# Patient Record
Sex: FEMALE | Race: BLACK OR AFRICAN AMERICAN | NOT HISPANIC OR LATINO | Employment: FULL TIME | URBAN - METROPOLITAN AREA
[De-identification: names, ages, dates, MRNs, and addresses within clinical notes are randomized per-mention and may not be internally consistent; named-entity substitution may affect disease eponyms.]

---

## 2018-07-11 ENCOUNTER — HOSPITAL ENCOUNTER (EMERGENCY)
Facility: HOSPITAL | Age: 48
Discharge: HOME | End: 2018-07-11
Attending: EMERGENCY MEDICINE
Payer: COMMERCIAL

## 2018-07-11 VITALS
WEIGHT: 220 LBS | TEMPERATURE: 99 F | DIASTOLIC BLOOD PRESSURE: 67 MMHG | OXYGEN SATURATION: 98 % | RESPIRATION RATE: 18 BRPM | HEIGHT: 64 IN | BODY MASS INDEX: 37.56 KG/M2 | SYSTOLIC BLOOD PRESSURE: 123 MMHG | HEART RATE: 73 BPM

## 2018-07-11 DIAGNOSIS — M25.562 LEFT KNEE PAIN, UNSPECIFIED CHRONICITY: Primary | ICD-10-CM

## 2018-07-11 PROCEDURE — 96372 THER/PROPH/DIAG INJ SC/IM: CPT

## 2018-07-11 PROCEDURE — 63600175 PHARM REV CODE 636 W HCPCS: Performed by: NURSE PRACTITIONER

## 2018-07-11 PROCEDURE — 25000003 PHARM REV CODE 250: Performed by: NURSE PRACTITIONER

## 2018-07-11 PROCEDURE — 99283 EMERGENCY DEPT VISIT LOW MDM: CPT | Mod: 25

## 2018-07-11 RX ORDER — LIDOCAINE 50 MG/G
1 PATCH TOPICAL
Status: DISCONTINUED | OUTPATIENT
Start: 2018-07-11 | End: 2018-07-11 | Stop reason: HOSPADM

## 2018-07-11 RX ORDER — BETAMETHASONE SODIUM PHOSPHATE AND BETAMETHASONE ACETATE 3; 3 MG/ML; MG/ML
12 INJECTION, SUSPENSION INTRA-ARTICULAR; INTRALESIONAL; INTRAMUSCULAR; SOFT TISSUE
Status: COMPLETED | OUTPATIENT
Start: 2018-07-11 | End: 2018-07-11

## 2018-07-11 RX ORDER — DICLOFENAC SODIUM 25 MG/1
75 TABLET, DELAYED RELEASE ORAL 3 TIMES DAILY PRN
COMMUNITY
End: 2019-02-21

## 2018-07-11 RX ADMIN — BETAMETHASONE SODIUM PHOSPHATE AND BETAMETHASONE ACETATE 12 MG: 3; 3 INJECTION, SUSPENSION INTRA-ARTICULAR; INTRALESIONAL; INTRAMUSCULAR at 05:07

## 2018-07-11 RX ADMIN — LIDOCAINE 1 PATCH: 50 PATCH CUTANEOUS at 05:07

## 2018-07-11 NOTE — DISCHARGE INSTRUCTIONS
Please wear Ace bandage for comfort.  Take your prescribed medication as directed, you may apply heating pad to the area.  Please read accompanying discharge information for care at home.  Elevate as much as possible.    Please return to the Emergency Department for any new or worsening symptoms including: fever, chest pain, shortness of breath, loss of consciousness, dizziness, weakness, or any other concerns.     Please follow up with your Primary Care Provider within in the week. If you do not have one, you may contact the one listed on your discharge paperwork or you may also call the [facility] Appointment Desk at [phone] to schedule an appointment with one.     Please take all medication as prescribed.

## 2018-07-11 NOTE — ED PROVIDER NOTES
Encounter Date: 2018  48 y.o. female with left knee pain. Reports recent xray shows fluid. No fever or injury. Patient will be seen by another provider for further evaluation when an exam room is available. Rodrigo BREWER, 4:11 PM       History     Chief Complaint   Patient presents with    Knee Pain     States she has left knee pain and was seen at a doctor and was prescribed Diclofenac.  States it hasn't helped     This is a 48-year-old female presents the emergency department for emergent evaluation of left knee pain.  Patient reports that approximately 3 weeks ago she started having left knee pain and precipitated by any injury. She reports that she works at [employer] and stands several hours daily.  She was seen by her primary care provider and x-rays were negative for fracture dislocation.  She states they did show it area of inflammation.  She is placed on Voltaren.  She states that she has been noncompliant with this as she states that does not work.  She reports her pain is been unrelieved by any over-the-counter adjuncts.  She has an orthopedic appointment on .  She denies chest pain, shortness of breath, fever, chills. The patient reports pain is precipitated by range of motion and walking.  She states that the last couple days she started having numbness and tingling in the left foot.          Review of patient's allergies indicates:  No Known Allergies  Past Medical History:   Diagnosis Date    Anemia     DUB (dysfunctional uterine bleeding)     Panic attacks      Past Surgical History:   Procedure Laterality Date     SECTION      x2     SECTION, LOW TRANSVERSE      X 2    HYSTERECTOMY  10/15/2014    Dr Barrett    TUBAL LIGATION       Family History   Problem Relation Age of Onset    Diabetes Mother      Social History   Substance Use Topics    Smoking status: Never Smoker    Smokeless tobacco: Never Used    Alcohol use Yes      Comment: socially     Review of  Systems   Constitutional: Negative for chills, fatigue and fever.   HENT: Negative for sore throat.    Respiratory: Negative for chest tightness and shortness of breath.    Cardiovascular: Negative for chest pain, palpitations and leg swelling.   Gastrointestinal: Negative for nausea.   Genitourinary: Negative for dysuria.   Musculoskeletal: Positive for arthralgias (left anterior knee). Negative for back pain, gait problem, joint swelling, myalgias, neck pain and neck stiffness.   Skin: Negative for rash.   Neurological: Negative for dizziness, syncope, facial asymmetry, weakness, light-headedness, numbness and headaches.   Hematological: Does not bruise/bleed easily.       Physical Exam     Initial Vitals [07/11/18 1608]   BP Pulse Resp Temp SpO2   (!) 146/81 89 16 98 °F (36.7 °C) 100 %      MAP       --         Physical Exam    Nursing note and vitals reviewed.  Constitutional: Vital signs are normal. She appears well-developed and well-nourished. She is cooperative.  Non-toxic appearance. She does not have a sickly appearance. She does not appear ill. No distress.   HENT:   Head: Normocephalic and atraumatic.   Eyes: Conjunctivae and EOM are normal. Pupils are equal, round, and reactive to light.   Neck: Normal range of motion.   Cardiovascular: Normal rate, regular rhythm and normal heart sounds.   No murmur heard.  Pulmonary/Chest: Effort normal and breath sounds normal. No respiratory distress. She has no wheezes. She exhibits no tenderness.   Abdominal: Soft. Bowel sounds are normal. She exhibits no distension and no mass. There is no tenderness. There is no rebound and no guarding.   Musculoskeletal: Normal range of motion.        Left knee: She exhibits normal range of motion, no swelling, no effusion, no ecchymosis, no deformity, no laceration, no erythema, normal alignment, no LCL laxity, normal patellar mobility, no bony tenderness, normal meniscus and no MCL laxity. No tenderness found. No medial joint  line, no lateral joint line, no MCL, no LCL and no patellar tendon tenderness noted.        Legs:  Patient has tenderness to the proximal left tibia just below the patella.  No erythema, edema or ecchymosis to this area, no patellar tenderness or laxity, no joint laxity, there is tenderness to the popliteal area, patient complains of tenderness to palpation to left calf.  Both calves are 39 the-39.5 cm.  No erythema or warmth noted to left calf.  Distal pulses are intact and equal bilaterally, distal sensation is full and intact, cap refills less than 2 sec,.  Patient can ambulate with a steady gait however does favor the left leg with a slight limp.  There are no obvious varicose veins.   Neurological: She is alert and oriented to person, place, and time. She has normal reflexes.   Skin: Skin is warm and dry. Capillary refill takes less than 2 seconds. No pallor.   Psychiatric: She has a normal mood and affect. Her behavior is normal. Judgment and thought content normal.         ED Course   Procedures  Labs Reviewed - No data to display       Imaging Results    None                APC / Resident Notes:   Maxine Geronimo is a 48 y.o. female who presents to the Emergency Department for evaluation of  left knee pain times several weeks.    Physical Exam shows a non-toxic, afebrile, and well appearing female. Pertinent exam findings include Patient has tenderness to the proximal left tibia just below the patella.  No erythema, edema or ecchymosis to this area, no patellar tenderness or laxity, no joint laxity, there is tenderness to the popliteal area, patient complains of tenderness to palpation to left calf.  Both calves are 39 the-39.5 cm.  No erythema or warmth noted to left calf.  Distal pulses are intact and equal bilaterally, distal sensation is full and intact, cap refills less than 2 sec,.  Patient can ambulate with a steady gait however does favor the left leg with a slight limp.  There are no obvious  varicose veins.  No obvious effusion.    Vital Signs Are Reassuring. If available, previous records reviewed.       My overall impression is left knee pain possibly muscle strain/sprain. I considered but do not suspect at this time cellulitis, DVT, sepsis, fracture, dislocation, septic joint, gout, effusion.     The patient was deemed safe and stable for discharge.    ED Course:  Sandie Mcdowell.  Ace bandage.  D/C Meds:  Continue to take previously prescribed Voltaren.  Additional D/C Information:  Continue taking medication, use Ace bandage for comfort, full keep orthopedic appointment, elevate, use over-the-counter muscle rubs the.  ED return precautions given.  The diagnosis, treatment plan, instructions for follow-up and reevaluation with PCP as well as ED return precautions were discussed and understanding was verbalized. All questions or concerns have been addressed. Patient was discharged home with an instructional sheet which gave not only information regarding the most likely diagnoses but also information regarding when to return to the emergency department for alarming symptoms and when to seek further care.      This case was discussed with Dr. Lew who is in agreement with my assessment and plan.                    Clinical Impression:   The encounter diagnosis was Left knee pain, unspecified chronicity.      Disposition:   Disposition: Discharged  Condition: Stable                        Ashley Brown NP  07/11/18 3852

## 2018-07-11 NOTE — ED TRIAGE NOTES
""Went to PCP on 26th of June due to knee tenderness, PCP thought it was a cyst, X-RAY came back as inflammation with fluid on the knee" This has been going for approx 3 weeks. Rates pain 9/10. C/o of numbness and tingling.   "

## 2019-02-21 ENCOUNTER — HOSPITAL ENCOUNTER (EMERGENCY)
Facility: HOSPITAL | Age: 49
Discharge: HOME | End: 2019-02-21
Attending: EMERGENCY MEDICINE
Payer: COMMERCIAL

## 2019-02-21 VITALS
RESPIRATION RATE: 18 BRPM | TEMPERATURE: 99 F | HEART RATE: 79 BPM | BODY MASS INDEX: 34.15 KG/M2 | HEIGHT: 64 IN | DIASTOLIC BLOOD PRESSURE: 67 MMHG | SYSTOLIC BLOOD PRESSURE: 117 MMHG | OXYGEN SATURATION: 96 % | WEIGHT: 200 LBS

## 2019-02-21 DIAGNOSIS — J06.9 VIRAL URI WITH COUGH: Primary | ICD-10-CM

## 2019-02-21 DIAGNOSIS — J20.9 ACUTE BRONCHITIS: ICD-10-CM

## 2019-02-21 LAB
CTP QC/QA: YES
FLUAV AG NPH QL: NEGATIVE
FLUBV AG NPH QL: NEGATIVE

## 2019-02-21 PROCEDURE — 25000003 PHARM REV CODE 250: Performed by: EMERGENCY MEDICINE

## 2019-02-21 PROCEDURE — 99284 EMERGENCY DEPT VISIT MOD MDM: CPT | Mod: 25

## 2019-02-21 RX ORDER — IBUPROFEN 600 MG/1
600 TABLET ORAL EVERY 6 HOURS PRN
Qty: 20 TABLET | Refills: 0 | Status: SHIPPED | OUTPATIENT
Start: 2019-02-21

## 2019-02-21 RX ORDER — PROMETHAZINE HYDROCHLORIDE AND DEXTROMETHORPHAN HYDROBROMIDE 6.25; 15 MG/5ML; MG/5ML
5 SYRUP ORAL EVERY 6 HOURS PRN
Qty: 180 ML | Refills: 0 | Status: SHIPPED | OUTPATIENT
Start: 2019-02-21 | End: 2019-03-03

## 2019-02-21 RX ORDER — IBUPROFEN 600 MG/1
600 TABLET ORAL
Status: COMPLETED | OUTPATIENT
Start: 2019-02-21 | End: 2019-02-21

## 2019-02-21 RX ADMIN — IBUPROFEN 600 MG: 600 TABLET ORAL at 07:02

## 2019-02-21 NOTE — ED PROVIDER NOTES
Encounter Date: 2019    SCRIBE #1 NOTE: I, Maddy Hi, am scribing for, and in the presence of,  French Lew MD. I have scribed the following portions of the note - Other sections scribed: ROS and HPI.       History     Chief Complaint   Patient presents with    Cough     pt states has had cough x 5 days. nausea & vomiting x 2 days. + nasal congestion and drainage. denies fever or chills. abd & back pain from cough     CC: Cough    HPI: This 48 y.o. female with a past medical history of Anemia, DUB and Panic attacks, presents to the ED complaining of productive cough with yellow sputum for last x1 week. Reports lower abdominal pain and lower back pain only with cough. She has associated mild rhinorrhea and body aches. She has x3 episode of post-tussive emesis yesterday. Denies hemoptysis. Denies fever, sore throat, SOB, N/V/D or any other sx. No prior medical intervention. Denies tobacco and/or alcohol use.    PSHx:  x2, hysterectomy.      The history is provided by the patient. No  was used.     Review of patient's allergies indicates:  No Known Allergies  Past Medical History:   Diagnosis Date    Anemia     DUB (dysfunctional uterine bleeding)     Panic attacks      Past Surgical History:   Procedure Laterality Date     SECTION      x2     SECTION, LOW TRANSVERSE      X 2    HYSTERECTOMY  10/15/2014    Dr Barrett    HYSTERECTOMY-SUPRA-CERVICAL N/A 10/15/2014    [facility]    SALPINGECTOMY Bilateral 10/15/2014    [facility]    TUBAL LIGATION       Family History   Problem Relation Age of Onset    Diabetes Mother      Social History     Tobacco Use    Smoking status: Never Smoker    Smokeless tobacco: Never Used   Substance Use Topics    Alcohol use: Yes     Comment: socially    Drug use: No     Review of Systems   Constitutional: Negative for chills and fever.   HENT: Positive for  rhinorrhea. Negative for ear pain and sore throat.    Eyes: Negative for pain.   Respiratory: Positive for cough (productive, yellow sputum). Negative for shortness of breath.    Cardiovascular: Negative for chest pain.   Gastrointestinal: Positive for abdominal pain (only w/cough). Negative for diarrhea, nausea and vomiting.        (+) post-tussive emesis x3 yesterday   Genitourinary: Negative for dysuria.   Musculoskeletal: Positive for back pain (only w/cough).        (+) generalized body aches   Skin: Negative for rash.   Neurological: Negative for headaches.       Physical Exam     Initial Vitals [02/21/19 0519]   BP Pulse Resp Temp SpO2   (!) 144/70 94 16 98.9 °F (37.2 °C) 99 %      MAP       --         Physical Exam  The patient was examined specifically for the following:   General:No significant distress, Good color, Warm and dry. Head and neck:Scalp atraumatic, Neck supple. Neurological:Appropriate conversation, Gross motor deficits. Eyes:Conjugate gaze, Clear corneas. ENT: No epistaxis. Cardiac: Regular rate and rhythm, Grossly normal heart tones. Pulmonary: Wheezing, Rales. Gastrointestinal: Abdominal tenderness, Abdominal distention. Musculoskeletal: Extremity deformity, Apparent pain with range of motion of the joints. Skin: Rash.   The findings on examination were normal except for the following:  Patient has obvious facial congestion and rhinorrhea. The pharynx is essentially unremarkable.  It is dark pink.  The lungs are clear and free of wheezing rales rubs and rhonchi.  There is no significant abdominal tenderness or back tenderness.  ED Course   Procedures  Labs Reviewed   POCT INFLUENZA A/B          Imaging Results          X-Ray Chest PA And Lateral (Final result)  Result time 02/21/19 07:38:30    Final result by Ayden Mendez MD (02/21/19 07:38:30)                 Impression:      No radiographic evidence of acute intrathoracic process.      Electronically signed by: Ayden Mendez  MD  Date:    02/21/2019  Time:    07:38             Narrative:    EXAMINATION:  XR CHEST PA AND LATERAL    CLINICAL HISTORY:  Acute bronchitis, unspecified    TECHNIQUE:  PA and lateral views of the chest were performed.    COMPARISON:  None    FINDINGS:  The cardiomediastinal silhouette appears within normal limits.  The lungs are symmetrically aerated without evidence of focal airspace consolidation.  There is no pleural effusion or pneumothorax.  Visualized osseous structures appear intact.                              Medical decision making:  Given the above this patient has had a one-week history of cough.  Chest x-ray is negative.    She has no sore throat. She has had some vomiting, that is post-tussive.  She complains of back and abdominal pain with coughing.  She does have rhinorrhea. I believe this is a viral URI.  There is no radiographic evidence of pneumonia or congestive heart failure.  I believe the pain in the belly and the back is muscular from coughing.  I will discharge the patient on ibuprofen and Phenergan DM.  I will have her follow up with primary care.  I will advise Benadryl for the rhinorrhea.                  Scribe Attestation:   Scribe #1: I performed the above scribed service and the documentation accurately describes the services I performed. I attest to the accuracy of the note.    Attending Attestation:           Physician Attestation for Scribe:  Physician Attestation Statement for Scribe #1: I, French Lew MD, reviewed documentation, as scribed by Maddy Hi in my presence, and it is both accurate and complete.                    Clinical Impression:       ICD-10-CM ICD-9-CM   1. Viral URI with cough J06.9 465.9    B97.89    2. Acute bronchitis J20.9 466.0                                French Lew MD  02/21/19 0814

## 2019-02-21 NOTE — DISCHARGE INSTRUCTIONS
Light clothing, lots of liquids.  Ibuprofen, 600 mg by mouth every 6 hr as needed for pain and chills. Phenergan DM for cough.  Benadryl or Zyrtec for facial congestion.  Please follow-up with your primary care doctor this week.  Rest.  Lots of liquids vaporizer throat lozenges.

## 2019-02-21 NOTE — ED TRIAGE NOTES
pt states has had productive cough x 5 days. nausea & vomiting (3 episodes yesterday) x 2 days. Pt report nasal congestion and drainage. Pt reports abd & back pain from coughing. Pt Denies fever,chills,or diarrhea. Pt reports taking robitussin around 2300 denies relief. Pain 9/10.

## 2022-06-16 ENCOUNTER — TELEPHONE (OUTPATIENT)
Dept: GASTROENTEROLOGY | Facility: CLINIC | Age: 52
End: 2022-06-16
Payer: COMMERCIAL

## 2022-06-16 DIAGNOSIS — Z12.11 COLON CANCER SCREENING: Primary | ICD-10-CM

## 2022-07-08 ENCOUNTER — HOSPITAL ENCOUNTER (OUTPATIENT)
Dept: RADIOLOGY | Facility: HOSPITAL | Age: 52
Discharge: HOME | End: 2022-07-08
Attending: FAMILY MEDICINE
Payer: COMMERCIAL

## 2022-07-08 DIAGNOSIS — Z12.31 ENCOUNTER FOR SCREENING MAMMOGRAM FOR MALIGNANT NEOPLASM OF BREAST: ICD-10-CM

## 2022-07-08 PROCEDURE — 77067 SCR MAMMO BI INCL CAD: CPT | Mod: 26,,, | Performed by: RADIOLOGY

## 2022-07-08 PROCEDURE — 77063 BREAST TOMOSYNTHESIS BI: CPT | Mod: 26,,, | Performed by: RADIOLOGY

## 2022-07-08 PROCEDURE — 77063 MAMMO DIGITAL SCREENING BILAT WITH TOMO: ICD-10-PCS | Mod: 26,,, | Performed by: RADIOLOGY

## 2022-07-08 PROCEDURE — 77067 SCR MAMMO BI INCL CAD: CPT | Mod: TC

## 2022-07-08 PROCEDURE — 77067 MAMMO DIGITAL SCREENING BILAT WITH TOMO: ICD-10-PCS | Mod: 26,,, | Performed by: RADIOLOGY

## 2022-11-08 DIAGNOSIS — Z12.11 COLON CANCER SCREENING: Primary | ICD-10-CM

## 2025-05-13 PROCEDURE — 99900041 HC LEFT WITHOUT BEING SEEN- EMERGENCY

## 2025-05-14 ENCOUNTER — HOSPITAL ENCOUNTER (EMERGENCY)
Facility: HOSPITAL | Age: 55
Discharge: LEFT BEFORE DISCHARGE | End: 2025-05-14
Attending: STUDENT IN AN ORGANIZED HEALTH CARE EDUCATION/TRAINING PROGRAM
Payer: COMMERCIAL

## 2025-05-14 VITALS
RESPIRATION RATE: 18 BRPM | HEART RATE: 72 BPM | SYSTOLIC BLOOD PRESSURE: 149 MMHG | HEIGHT: 65 IN | DIASTOLIC BLOOD PRESSURE: 70 MMHG | TEMPERATURE: 98 F | WEIGHT: 214 LBS | OXYGEN SATURATION: 100 % | BODY MASS INDEX: 35.65 KG/M2